# Patient Record
(demographics unavailable — no encounter records)

---

## 2024-11-17 NOTE — HISTORY OF PRESENT ILLNESS
[FreeTextEntry1] : 52-year-old female with a past medical history of Parotid Ca, PVC's, S/P Emergent Type A Dissection repair, ascending aorta and hemiarch replacement with 28 x 8 Gelweave graft on 2/11/21 by Dr.Syed Reed, presents for one year follow up visit with diagnostic imaging for aortic pathology and dilated innominate artery.  CTA C/A/P +TTE  CTA CAP 11/6/2024: Subtle increase size ascending aorta and abdominal aorta compared to 11.2023 CT.  Renal and mesentaeric arterial vasculature, stable RUL nodule  TTE July 2024: Mild MR, dilated root, mild AI  Presents today with her .  Doing and feeling well. Had PVC ablation September, less palpitations.  Still feels occasional flutter in her neck area.  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   Denies CP, back pain, SOB, palpitations, dizziness, numbness/tingling, swelling or weight gain.

## 2024-11-17 NOTE — END OF VISIT
[FreeTextEntry3] : Written by Enzo Navas NP, acting as a scribe for Dr. Cortez. The documentation recorded by the scribe accurately reflects the service I personally performed and the decisions made by me. Signature Nalini Cortez MD. I, NALINI Lee personally performed the evaluation and management (E/M) services for this established patient who presents today with (a) new problem(s)/exacerbation of (an) existing condition(s).  That E/M includes conducting the clinically appropriate interval history &/or exam, assessing all new/exacerbated conditions, and establishing a new plan of care.  Today, my PAULINA, was here to observe &/or participate in the visit & follow plan of care established by me.

## 2024-11-17 NOTE — ASSESSMENT
[FreeTextEntry1] : I have reviewed the patient's medical records, diagnostic images during the time of this office consultation and have made the following recommendation. CTA CAP was completed. The report was reviewed and noted in the chart, I independently reviewed images and report at time of visit.  Review of the imaging shows his aortic pathology has remained stable and does not require surgical intervention at this time.   Plan:  - Follow up in [facility] in 6 months with MRI - Continue medication regimen. - Follow up with cardiologist and PCP  - Blood pressure management reviewed and discussed at length as it pertains to her aneurysm - Discussed signs and symptoms that warrant emergency medical attention. - Call with any questions or concerns

## 2024-11-17 NOTE — DATA REVIEWED
[FreeTextEntry1] : 11/3/23 CTA CAP revealed Stable thoracoabdominal aortic dissection. Stable dilated aortic root. The patient is again noted to be status repair of the ascending thoracic aorta beginning proximally at the sinotubular junction and extending to just proximal to the brachiocephalic artery. The aortic root at the sinus of the Valsalva is again prominent measuring 5.0 cm in maximal diameter which is stable in appearance. The mid ascending thoracic aorta in area of repair remains stable measuring approximately 3.0 cm in maximal diameter. There is again aortic dissection associated with the distal suture margin which traverses along the left lateral aspect of the aortic arch and proceeds inferiorly along left lateral aspect of the descending thoracic aorta with the true and false lumens are nearly equal in size. The aortic dimensions are unaltered. Again many including the true and false lumen the aorta at the apex of the arch the aorta measures 3.2 cm in maximal diameter and mid descending thoracic aorta approximately 2.5 cm in maximal diameter. The dissection again continues into the abdominal aorta. The dissection continues into the brachiocephalic artery terminating shortly distal to the vessel origin. The left common carotid artery and left subclavian artery again noted to originate from the true lumen remain unremarkable. The proximal visualized distributed vessels remain otherwise unremarkable. - Stable nodule right apex. - New patchy groundglass densities left upper lobe likely infiltrative or inflammatory. Correlate patient's clinical findings. A short-term follow-up after the completion of any deemed appropriate medical therapy would be advised. At minimum a 3 month follow-up advised.   11/5/22 CTA CAP revealed Status post root sparing ascending aortic replacement for dissection repair. Unchanged residual dissection flap extending from the proximal arch into the infrarenal aorta near the iliac bifurcation as well as superiorly into the innominate and left common carotid arteries, without interval propagation. There is persistent opacification of the false lumen, although 20 lesser extent than the true lumen. The celiac trunk is patent and arises from the both the true and false lumen, the SMA, right renal artery and JAYCEE arise from the true lumen while the left renal artery arises from the false lumen. Some of the comparable measurements are as follows: Sinus of Valsalva: 4.5 cm, stable. Ascending thoracic aorta at the level of main pulmonary artery: 2.9 cm, stable.  5/4/22 CTA C/A/P revealed Redemonstration of repair involving the ascending aortic component of the dissection. Subtle extension into the proximal innominate artery is unchanged in appearance. Once again no extension of dissection is seen into the left common carotid and subclavian. The arch vessels are perfused from the true lumen. The appearance of dissection involving the ascending thoracic and abdominal aorta is essentially unchanged. Once again the celiac and left renal artery originate from the false lumen whereas the superior mesenteric and right renal artery are perfused from the true lumen. Inferior mesenteric artery is also perfused from the true lumen. There is no extension of dissection into the iliac arteries.  Some of the comparable measurements are as follows: Sinus of Valsalva: 4.5 cm, stable. Ascending thoracic aorta at the level of main pulmonary artery: 2.9 cm, stable. Mid arch: 3.7 cm, stable. Descending thoracic aorta at the level of left pulmonary artery: 2.8 cm, stable. Aortic hiatus: 2.4 cm, stable. Abdominal aorta at the right renal artery: 2.3 cm, stable. 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 -High density contrast is seen in the proximal aspect of the dissection in the proximal arch likely representing a proximal leak -When compared to the recent postop study, the appearance and measurements of the aorta are stable. - There is a thickening of the endometrium with probable endometrial fluid. Correlate with menstrual cycle. Consider pelvic ultrasound

## 2025-01-21 NOTE — ASSESSMENT
[FreeTextEntry1] : 51 y/o woman hx of aortic and carotid dissection, FBN1 gene variant connective tissue disorder, and ocular migraine. Reduced headaches, remaining with similar amount of ocular migraines. She is currently going through menopause which may be triggering more symptoms. She has not been on hormone replacement because of the history of ocular migraine due to increased risk of stroke. She has inquiries of HRT today.  Plan: No estrogen BC in the setting of ocular migraines  MRA head and neck wo C-r/o other aneurysms MRI C spine no contrast- r/o herniation, SCC Continue on Mg Glycinate for sleep Patient declined PT, massage therapy and muscle relaxers at this time, will call if she changes her mind.  Education on proper sleeping position  Answered all questions and concerns to the best of my ability. Advised to call for any new or worsening symptoms.    In order to maintain continuity of care/prescription refills, patients must be seen on a yearly basis.   Total time spent on the day of the visit, including pre-visit and post-visit time was 35 minutes.

## 2025-01-21 NOTE — PHYSICAL EXAM
[General Appearance - Alert] : alert [General Appearance - In No Acute Distress] : in no acute distress [Oriented To Time, Place, And Person] : oriented to person, place, and time [Impaired Insight] : insight and judgment were intact [Affect] : the affect was normal [Sclera] : the sclera and conjunctiva were normal [PERRL With Normal Accommodation] : pupils were equal in size, round, reactive to light, with normal accommodation [Extraocular Movements] : extraocular movements were intact [Full Visual Field] : full visual field [Outer Ear] : the ears and nose were normal in appearance [Hearing Threshold Finger Rub Not [place]] : hearing was normal [Neck Appearance] : the appearance of the neck was normal [] : no respiratory distress [Respiration, Rhythm And Depth] : normal respiratory rhythm and effort [Abnormal Walk] : normal gait [Nail Clubbing] : no clubbing  or cyanosis of the fingernails [FreeTextEntry1] : midsternal scar

## 2025-01-21 NOTE — HISTORY OF PRESENT ILLNESS
[FreeTextEntry1] : Patient is a 52-year-old female with PMHX of aortic and carotid dissection, FBN1 gene variant, connective tissue disorder, ocular migraine being seen in follow up today. Patient states no new complaints however was inquiring on the use of HRT for her menopausal symptoms, due to the contraindication with her ocular migraines. Patient states she remains with these ocular migraines intermittently monthly, with no headache associated with them. She has headaches independently of them. Patient states the magnesium is helping her sleep but does not help the ocular migraines. Patient next abdominal aorta scan is in May, she states last read 5cm, stating if it increases in size, she will likely have another repair.   Pt follow with Dr. Ray Cho  Intermittent N/T of fingers and toes when cold, not entirely bothersome to her at this moment.  Denies confusion, hearing issues/tinnitus, HA, CP, focal weakness, difficulty walking, falls, LOC, fever, chills. Denies history of seizures, stroke, stroke like symptoms Denies anxiety, depression ___________________________________________________________________________ PREVIOUSLY SEEN BY SS6/16/2024- ORIGINAL HPI  CC: Dr. Trinidad  Patient Referred by:    HPI: 51 y/o woman hx of aortic and carotid dissection, FBN1 gene variant, connective tissue disorder, ocular migraine referred by her cardiologist to establish care.  She also has terrible neck pain all the time. She has some bulging discs in her neck. She denies any numbness, tingling in the arms or hands.    HAs began: early 20's Location: frontal Quality: throbbing, sharp,  Severity: moderate Disability: MIDAS 0 Duration: 45min-2 hrs Frequency: aura can be three times a week or a month.  Temporal course: Time of day: Pain Free between Attacks: yes Triggers: bright lights,  Relieving factors: Exacerbating factors: exercise and Valsalva maneuver do not make headache worse Prodrome or postdrome:   Aura: visual aura starts on the right, triangles, shimmering, moves across her vision towards the left, has difficulty seeing, 10-15 minutes.    Associated with menses: unclear   Ass'd Symptoms: Nausea - Vomiting Photophobia + Phonophobia: Osmophobia brain fog/difficulty concentrating Irritability Allodynia - Blurred Vision Neck pain + Dizziness   Autonomic features: none   Diet:  hydration: Ophthalmologic: goes every year Dr. Quigley  Sex Active/Pregnancy planning: none                      contraception: Menstruation:  irregular  -  none since April                            age of menarche:                                 age of menopause: currently  Treatment present: Acute: ibuprofen 600, tylenol Preventive:   Prior medications:   Non-pharmacologic Tx:   Imaging: At C5-C6 there is a right paracentral to foraminal disc protrusion along with right-sided osteophytic ridging and uncovertebral hypertrophy. There is mild to moderate thecal sac compression and abutment of the right anterolateral aspect of the spinal cord. There is moderate right foraminal stenosis.  At C6-C7 there is a small posterior disc protrusion and annular fissure. There is mild thecal sac compression.  At C3-C4 and C4-C5 there is mild to moderate left foraminal stenosis.  Labs:  Childhood precursors: somnambulism  FH: mother , sister, and niece have migraines

## 2025-01-21 NOTE — REVIEW OF SYSTEMS
[Eyesight Problems] : eyesight problems [Negative] : Heme/Lymph [FreeTextEntry3] : photophobia [As Noted in HPI] : as noted in HPI [Chest Pain] : no chest pain [Shortness Of Breath] : shortness of breath [FreeTextEntry6] : intermittently, attributed to aortic aneurysm

## 2025-02-18 NOTE — HISTORY OF PRESENT ILLNESS
[FreeTextEntry1] : neck pain and stiffness [de-identified] : 52 year old female referred by neurology has history of aortic disection and conective tissue disorder reports chronic neck pain denies relieving or aggravating factors no new nt, weakness, balance issues, or bladder issues  had an mri at [facility]   has had PT in the passed

## 2025-02-18 NOTE — ASSESSMENT
[FreeTextEntry1] : 52 year old female with neck pain no acute neurosurgical intervention Will attempt trial of physical therapy have provided a script to the patient  no further follow up needed

## 2025-02-18 NOTE — REASON FOR VISIT
[New Patient Visit] : a new patient visit [FreeTextEntry1] : Pt is here with complaints of cervical.

## 2025-02-18 NOTE — RESULTS/DATA
[FreeTextEntry1] : mri cervical spines shows multi level DDD with spondylolisthesis at C5-6-7 without significant spinal stenosis or compression

## 2025-04-29 NOTE — ASSESSMENT
[FreeTextEntry1] : 51 y/o woman hx of aortic and carotid dissection, FBN1 gene variant connective tissue disorder, and ocular migraine. Reduced headaches, remaining with similar amount of ocular migraines. She is currently going through menopause which may be triggering more symptoms. She has not been on hormone replacement because of the history of ocular migraine due to increased risk of stroke.   MRA brain: Infundibulum at origin of vessel arising from superior surface of M1 segment of right MCA. 1-2mm outpouching arising from anterior surface of left PCA. Subtle aera of linear low signal in right cavernous carotid artery an area of low signal in supraclinoid right ICA  Plan: No estrogen HRT in the setting of ocular migraines  Trial of nurtec, script given as well Will see with Dr. Miller in near future for eval of small cerebral aneurysms in setting of connective tissue disorder Continue on Mg Glycinate for sleep Education on proper sleeping position  Answered all questions and concerns to the best of my ability. Advised to call for any new or worsening symptoms.    In order to maintain continuity of care/prescription refills, patients must be seen on a yearly basis.   Total time spent on the day of the visit, including pre-visit and post-visit time was 35 minutes.

## 2025-04-29 NOTE — PHYSICAL EXAM
[General Appearance - Alert] : alert [General Appearance - In No Acute Distress] : in no acute distress [Oriented To Time, Place, And Person] : oriented to person, place, and time [Impaired Insight] : insight and judgment were intact [Affect] : the affect was normal [Sclera] : the sclera and conjunctiva were normal [PERRL With Normal Accommodation] : pupils were equal in size, round, reactive to light, with normal accommodation [Extraocular Movements] : extraocular movements were intact [Full Visual Field] : full visual field [Outer Ear] : the ears and nose were normal in appearance [Hearing Threshold Finger Rub Not [place]] : hearing was normal [Neck Appearance] : the appearance of the neck was normal [] : no respiratory distress [Respiration, Rhythm And Depth] : normal respiratory rhythm and effort [Abnormal Walk] : normal gait [Nail Clubbing] : no clubbing  or cyanosis of the fingernails [FreeTextEntry1] : midsternal scar

## 2025-04-29 NOTE — HISTORY OF PRESENT ILLNESS
[FreeTextEntry1] : Patient is in for follow up for ocular migraines. Stating she has them 2-3 times/week. States she had recent aortic aneurysm imaging and follows with cardio soon to see if she will need repair if aneurysm has grown. She remains off HRT in setting of ocular migraines and vascular contraindications.   Review of vascular imaging today.  Otherwise doing fairly well with no further complaints.   ________________________________INTERVAL HX___________________________________________ Patient is a 52-year-old female with PMHX of aortic and carotid dissection, FBN1 gene variant, connective tissue disorder, ocular migraine being seen in follow up today. Patient states no new complaints however was inquiring on the use of HRT for her menopausal symptoms, due to the contraindication with her ocular migraines. Patient states she remains with these ocular migraines intermittently monthly, with no headache associated with them. She has headaches independently of them. Patient states the magnesium is helping her sleep but does not help the ocular migraines. Patient next abdominal aorta scan is in May, she states last read 5cm, stating if it increases in size, she will likely have another repair.   Pt follow with Dr. Ray Cho  Intermittent N/T of fingers and toes when cold, not entirely bothersome to her at this moment.  Denies confusion, hearing issues/tinnitus, HA, CP, focal weakness, difficulty walking, falls, LOC, fever, chills. Denies history of seizures, stroke, stroke like symptoms Denies anxiety, depression ___________________________________________________________________________ PREVIOUSLY SEEN BY ELIZABETH6/16/2024- ORIGINAL HPI  CC: Dr. Trinidad  Patient Referred by:    HPI: 53 y/o woman hx of aortic and carotid dissection, FBN1 gene variant, connective tissue disorder, ocular migraine referred by her cardiologist to establish care.  She also has terrible neck pain all the time. She has some bulging discs in her neck. She denies any numbness, tingling in the arms or hands.    HAs began: early 20's Location: frontal Quality: throbbing, sharp,  Severity: moderate Disability: MIDAS 0 Duration: 45min-2 hrs Frequency: aura can be three times a week or a month.  Temporal course: Time of day: Pain Free between Attacks: yes Triggers: bright lights,  Relieving factors: Exacerbating factors: exercise and Valsalva maneuver do not make headache worse Prodrome or postdrome:   Aura: visual aura starts on the right, triangles, shimmering, moves across her vision towards the left, has difficulty seeing, 10-15 minutes.    Associated with menses: unclear   Ass'd Symptoms: Nausea - Vomiting Photophobia + Phonophobia: Osmophobia brain fog/difficulty concentrating Irritability Allodynia - Blurred Vision Neck pain + Dizziness   Autonomic features: none   Diet:  hydration: Ophthalmologic: goes every year Dr. Quigley  Sex Active/Pregnancy planning: none                      contraception: Menstruation:  irregular  -  none since April                            age of menarche:                                 age of menopause: currently  Treatment present: Acute: ibuprofen 600, tylenol Preventive:   Prior medications:   Non-pharmacologic Tx:   Imaging: At C5-C6 there is a right paracentral to foraminal disc protrusion along with right-sided osteophytic ridging and uncovertebral hypertrophy. There is mild to moderate thecal sac compression and abutment of the right anterolateral aspect of the spinal cord. There is moderate right foraminal stenosis.  At C6-C7 there is a small posterior disc protrusion and annular fissure. There is mild thecal sac compression.  At C3-C4 and C4-C5 there is mild to moderate left foraminal stenosis.  Labs:  Childhood precursors: somnambulism  FH: mother , sister, and niece have migraines

## 2025-04-29 NOTE — PHYSICAL EXAM
[General Appearance - Alert] : alert [General Appearance - In No Acute Distress] : in no acute distress [Oriented To Time, Place, And Person] : oriented to person, place, and time [Impaired Insight] : insight and judgment were intact [Affect] : the affect was normal [Sclera] : the sclera and conjunctiva were normal [PERRL With Normal Accommodation] : pupils were equal in size, round, reactive to light, with normal accommodation [Extraocular Movements] : extraocular movements were intact [Full Visual Field] : full visual field [Outer Ear] : the ears and nose were normal in appearance [Hearing Threshold Finger Rub Not [place]] : hearing was normal [Neck Appearance] : the appearance of the neck was normal [] : no respiratory distress [Respiration, Rhythm And Depth] : normal respiratory rhythm and effort [Abnormal Walk] : normal gait [Nail Clubbing] : no clubbing  or cyanosis of the fingernails [FreeTextEntry1] : midsternal scar

## 2025-04-29 NOTE — REVIEW OF SYSTEMS
[Eyesight Problems] : eyesight problems [Negative] : Heme/Lymph [As Noted in HPI] : as noted in HPI [Shortness Of Breath] : shortness of breath [FreeTextEntry3] : photophobia [Chest Pain] : no chest pain [FreeTextEntry6] : intermittently, attributed to aortic aneurysm

## 2025-05-16 NOTE — PHYSICAL EXAM
[Sclera] : the sclera and conjunctiva were normal [Neck Appearance] : the appearance of the neck was normal [Jugular Venous Distention Increased] : there was no jugular-venous distention [] : no respiratory distress [Respiration, Rhythm And Depth] : normal respiratory rhythm and effort [Exaggerated Use Of Accessory Muscles For Inspiration] : no accessory muscle use [Auscultation Breath Sounds / Voice Sounds] : lungs were clear to auscultation bilaterally [Apical Impulse] : the apical impulse was normal [Heart Rate And Rhythm] : heart rate was normal and rhythm regular [Heart Sounds] : normal S1 and S2 [Heart Sounds Gallop] : no gallops [Abdomen Soft] : soft [Abdomen Tenderness] : non-tender [Involuntary Movements] : no involuntary movements were seen [No Focal Deficits] : no focal deficits [Oriented To Time, Place, And Person] : oriented to person, place, and time [Impaired Insight] : insight and judgment were intact [Affect] : the affect was normal [Mood] : the mood was normal

## 2025-05-18 NOTE — PROCEDURE
[FreeTextEntry1] :  Patient was advised to view the educational video prior to this visit regarding aortic pathology, risk factors, surgical procedures, and lifestyle modifications. Video can be retrieved at [email or web address].

## 2025-05-18 NOTE — PROCEDURE
[FreeTextEntry1] :  Patient was advised to view the educational video prior to this visit regarding aortic pathology, risk factors, surgical procedures, and lifestyle modifications. Video can be retrieved at [email or web address].

## 2025-05-18 NOTE — HISTORY OF PRESENT ILLNESS
[FreeTextEntry1] : 52-year-old female with a past medical history of Parotid Ca, PVC's, S/P Emergent Type A Dissection repair, ascending aorta and hemiarch replacement with 28 x 8 Gelweave graft on 2/11/21 by Dr. Jonah Reed, presents for SIX MONTH follow up visit with diagnostic imaging for aortic pathology and dilated innominate artery.  MRA CHEST 4/22/25 Aortic root ascending aorta 4.4cm unchanged.  stable size of ascending aortic graft repair stable aortic intimal dissection extending from distal aortic arch into the abdominal aorta.   Genetic tests 2021 No known pathogenic mutations were identified in any of the 57 genes evaluated. A variant of uncertain significance (VUS) was identified in the FBN1 gene  Presents today with her . Doing and feeling well.  Always a little nervous with follow up.   Denies CP, back pain, SOB, palpitations, dizziness, numbness/tingling, swelling or weight gain.

## 2025-05-18 NOTE — PROCEDURE
[FreeTextEntry1] :  Patient was advised to view the educational video prior to this visit regarding aortic pathology, risk factors, surgical procedures, and lifestyle modifications. Video can be retrieved at [email or web address].

## 2025-05-18 NOTE — ASSESSMENT
[FreeTextEntry1] : I have reviewed the patient's medical records, diagnostic images during the time of this office consultation and have made the following recommendation. MRA was completed of the thoracic aorta. The report was reviewed and noted in the chart, I independently reviewed and interpreted images and report and I reviewed the films/CD and agree. The surgical repair is intact and stable.  Plan  - Follow up [facility] in 1 year with repeat imaging.  - Continue medication regimen. - Follow up with cardiologist and PCP. - Blood pressure management. - Discussed signs and symptoms that warrant emergency medical attention.

## 2025-05-18 NOTE — PROCEDURE
[FreeTextEntry1] :  Patient was advised to view the educational video prior to this visit regarding aortic pathology, risk factors, surgical procedures, and lifestyle modifications. Video can be retrieved at [email or web address].

## 2025-05-18 NOTE — DATA REVIEWED
[FreeTextEntry1] : CTA CAP 11/6/2024: Subtle increase size ascending aorta and abdominal aorta compared to 11.2023 CT.  Renal and mesenteric arterial vasculature, stable RUL nodule  TTE July 29 2024: Mild MR, dilated root, mild AI  11/3/23 CTA CAP revealed Stable thoracoabdominal aortic dissection. Stable dilated aortic root. The patient is again noted to be status repair of the ascending thoracic aorta beginning proximally at the sinotubular junction and extending to just proximal to the brachiocephalic artery. The aortic root at the sinus of the Valsalva is again prominent measuring 5.0 cm in maximal diameter which is stable in appearance. The mid ascending thoracic aorta in area of repair remains stable measuring approximately 3.0 cm in maximal diameter. There is again aortic dissection associated with the distal suture margin which traverses along the left lateral aspect of the aortic arch and proceeds inferiorly along left lateral aspect of the descending thoracic aorta with the true and false lumens are nearly equal in size. The aortic dimensions are unaltered. Again many including the true and false lumen the aorta at the apex of the arch the aorta measures 3.2 cm in maximal diameter and mid descending thoracic aorta approximately 2.5 cm in maximal diameter. The dissection again continues into the abdominal aorta. The dissection continues into the brachiocephalic artery terminating shortly distal to the vessel origin. The left common carotid artery and left subclavian artery again noted to originate from the true lumen remain unremarkable. The proximal visualized distributed vessels remain otherwise unremarkable. - Stable nodule right apex. - New patchy groundglass densities left upper lobe likely infiltrative or inflammatory. Correlate patient's clinical findings. A short-term follow-up after the completion of any deemed appropriate medical therapy would be advised. At minimum a 3 month follow-up advised.   11/5/22 CTA CAP revealed Status post root sparing ascending aortic replacement for dissection repair. Unchanged residual dissection flap extending from the proximal arch into the infrarenal aorta near the iliac bifurcation as well as superiorly into the innominate and left common carotid arteries, without interval propagation. There is persistent opacification of the false lumen, although 20 lesser extent than the true lumen. The celiac trunk is patent and arises from the both the true and false lumen, the SMA, right renal artery and JAYCEE arise from the true lumen while the left renal artery arises from the false lumen. Some of the comparable measurements are as follows: Sinus of Valsalva: 4.5 cm, stable. Ascending thoracic aorta at the level of main pulmonary artery: 2.9 cm, stable.  5/4/22 CTA C/A/P revealed Redemonstration of repair involving the ascending aortic component of the dissection. Subtle extension into the proximal innominate artery is unchanged in appearance. Once again no extension of dissection is seen into the left common carotid and subclavian. The arch vessels are perfused from the true lumen. The appearance of dissection involving the ascending thoracic and abdominal aorta is essentially unchanged. Once again the celiac and left renal artery originate from the false lumen whereas the superior mesenteric and right renal artery are perfused from the true lumen. Inferior mesenteric artery is also perfused from the true lumen. There is no extension of dissection into the iliac arteries.  Some of the comparable measurements are as follows: Sinus of Valsalva: 4.5 cm, stable. Ascending thoracic aorta at the level of main pulmonary artery: 2.9 cm, stable. Mid arch: 3.7 cm, stable. Descending thoracic aorta at the level of left pulmonary artery: 2.8 cm, stable. Aortic hiatus: 2.4 cm, stable. Abdominal aorta at the right renal artery: 2.3 cm, stable. 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 -High density contrast is seen in the proximal aspect of the dissection in the proximal arch likely representing a proximal leak -When compared to the recent postop study, the appearance and measurements of the aorta are stable. - There is a thickening of the endometrium with probable endometrial fluid. Correlate with menstrual cycle. Consider pelvic ultrasound

## 2025-07-30 NOTE — ASSESSMENT
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   Reduced headaches, remaining with similar amount of ocular migraines. She is currently going through menopause which may be triggering more symptoms. She has not been on hormone replacement because of the history of ocular migraine due to increased risk of vascular events.   I have personally reviewed available neuroradiological images. MRA brain 2/2025 ZP: Infundibulum at origin of vessel arising from superior surface of M1 segment of right MCA. 1-2mm outpouching arising from anterior surface of left PCA. Subtle aera of linear low signal in right cavernous carotid artery an area of low signal in supraclinoid right ICA MRA neck no contrast 2/2025 ZP: No significant stenosis of the visualized common or internal carotid arteries.  Aortic dissection.  Uncertain etiology of positional vertigo and right hand paresthesia. MRI head and MRa head recommended now to eval for ischemic infarct. She had recent tick bite at time of onset of symptoms as well.   RECOMMENDATION: -Repeat MRA brain stat -MRI brain stat -Tick bite panel ([place]).  - caution with Estrogen HRT in the setting of ocular migraines - patient counseled risk vs benefit.  - Nurtec 75 mg every other day or as needed    Follow up 2 weeks.    Answered all questions and concerns to the best of my ability. Advised to call for any new or worsening symptoms.    In order to maintain continuity of care/prescription refills, patients must be seen on a yearly basis.   Total time spent on the day of the visit, including pre-visit and post-visit time was 45 minutes.

## 2025-07-30 NOTE — HISTORY OF PRESENT ILLNESS
[FreeTextEntry1] : Follow up for aneurysm vs infundibulum from 1-2 mm anterior surface of left posterior cerebral artery, as well as infundibulum at origin of vessel arising from superior surface of M1 segment of right MCA in the setting of connective tissue disorder (FBN1 gene variant). Accompanied today by her . She remains with known ocular migraines with geometric patterns- without headache.  On Nurtec for ocular migraines.  Patient endorses dizzy spells (room spinning) and right thumb and index finger numbness/tingling both since fourth of july weekend. She states the dizziness hasnt occurred in about a week, however has occurred 10-12 times since that weekend.  She remains with consistent right thumb and index finger numbness/tingling.